# Patient Record
Sex: FEMALE | Race: OTHER
[De-identification: names, ages, dates, MRNs, and addresses within clinical notes are randomized per-mention and may not be internally consistent; named-entity substitution may affect disease eponyms.]

---

## 2019-12-20 ENCOUNTER — HOSPITAL ENCOUNTER (INPATIENT)
Dept: HOSPITAL 72 - SDSOVERFLO | Age: 29
LOS: 1 days | Discharge: HOME | DRG: 743 | End: 2019-12-21
Payer: COMMERCIAL

## 2019-12-20 VITALS — DIASTOLIC BLOOD PRESSURE: 62 MMHG | SYSTOLIC BLOOD PRESSURE: 96 MMHG

## 2019-12-20 VITALS — DIASTOLIC BLOOD PRESSURE: 68 MMHG | SYSTOLIC BLOOD PRESSURE: 102 MMHG

## 2019-12-20 VITALS — SYSTOLIC BLOOD PRESSURE: 105 MMHG | DIASTOLIC BLOOD PRESSURE: 62 MMHG

## 2019-12-20 VITALS — DIASTOLIC BLOOD PRESSURE: 70 MMHG | SYSTOLIC BLOOD PRESSURE: 102 MMHG

## 2019-12-20 VITALS — WEIGHT: 127 LBS | BODY MASS INDEX: 19.93 KG/M2 | HEIGHT: 67 IN

## 2019-12-20 VITALS — DIASTOLIC BLOOD PRESSURE: 60 MMHG | SYSTOLIC BLOOD PRESSURE: 93 MMHG

## 2019-12-20 VITALS — DIASTOLIC BLOOD PRESSURE: 69 MMHG | SYSTOLIC BLOOD PRESSURE: 102 MMHG

## 2019-12-20 VITALS — SYSTOLIC BLOOD PRESSURE: 104 MMHG | DIASTOLIC BLOOD PRESSURE: 65 MMHG

## 2019-12-20 VITALS — SYSTOLIC BLOOD PRESSURE: 102 MMHG | DIASTOLIC BLOOD PRESSURE: 69 MMHG

## 2019-12-20 VITALS — SYSTOLIC BLOOD PRESSURE: 100 MMHG | DIASTOLIC BLOOD PRESSURE: 64 MMHG

## 2019-12-20 VITALS — SYSTOLIC BLOOD PRESSURE: 92 MMHG | DIASTOLIC BLOOD PRESSURE: 61 MMHG

## 2019-12-20 VITALS — SYSTOLIC BLOOD PRESSURE: 99 MMHG | DIASTOLIC BLOOD PRESSURE: 66 MMHG

## 2019-12-20 VITALS — DIASTOLIC BLOOD PRESSURE: 67 MMHG | SYSTOLIC BLOOD PRESSURE: 113 MMHG

## 2019-12-20 DIAGNOSIS — D25.2: ICD-10-CM

## 2019-12-20 DIAGNOSIS — N83.291: ICD-10-CM

## 2019-12-20 DIAGNOSIS — D25.1: Primary | ICD-10-CM

## 2019-12-20 PROCEDURE — 94150 VITAL CAPACITY TEST: CPT

## 2019-12-20 PROCEDURE — 81025 URINE PREGNANCY TEST: CPT

## 2019-12-20 PROCEDURE — 94003 VENT MGMT INPAT SUBQ DAY: CPT

## 2019-12-20 PROCEDURE — 0UDB7ZZ EXTRACTION OF ENDOMETRIUM, VIA NATURAL OR ARTIFICIAL OPENING: ICD-10-PCS

## 2019-12-20 PROCEDURE — 80048 BASIC METABOLIC PNL TOTAL CA: CPT

## 2019-12-20 PROCEDURE — 36415 COLL VENOUS BLD VENIPUNCTURE: CPT

## 2019-12-20 PROCEDURE — 86850 RBC ANTIBODY SCREEN: CPT

## 2019-12-20 PROCEDURE — 86900 BLOOD TYPING SEROLOGIC ABO: CPT

## 2019-12-20 PROCEDURE — 85025 COMPLETE CBC W/AUTO DIFF WBC: CPT

## 2019-12-20 PROCEDURE — 86901 BLOOD TYPING SEROLOGIC RH(D): CPT

## 2019-12-20 PROCEDURE — 87081 CULTURE SCREEN ONLY: CPT

## 2019-12-20 PROCEDURE — 0UB90ZZ EXCISION OF UTERUS, OPEN APPROACH: ICD-10-PCS

## 2019-12-20 RX ADMIN — SODIUM CHLORIDE, SODIUM LACTATE, POTASSIUM CHLORIDE, AND CALCIUM CHLORIDE SCH MLS/HR: .6; .31; .03; .02 INJECTION, SOLUTION INTRAVENOUS at 18:36

## 2019-12-20 RX ADMIN — KETOROLAC TROMETHAMINE SCH MG: 30 INJECTION, SOLUTION INTRAMUSCULAR; INTRAVENOUS at 18:04

## 2019-12-20 RX ADMIN — DOCUSATE SODIUM SCH MG: 100 CAPSULE, LIQUID FILLED ORAL at 18:04

## 2019-12-20 RX ADMIN — DOCUSATE SODIUM SCH MG: 100 CAPSULE, LIQUID FILLED ORAL at 17:41

## 2019-12-20 RX ADMIN — KETOROLAC TROMETHAMINE SCH MG: 30 INJECTION, SOLUTION INTRAMUSCULAR; INTRAVENOUS at 13:12

## 2019-12-20 RX ADMIN — SODIUM CHLORIDE PRN MG: 9 INJECTION, SOLUTION INTRAVENOUS at 21:02

## 2019-12-20 NOTE — ANETHESIA PREOPERATIVE EVAL
Anesthesia Pre-op PMH/ROS


General


Date of Evaluation:  Dec 20, 2019


Time of Evaluation:  07:30


Anesthesiologist:  nuria


ASA Score:  ASA 1


Mallampati Score


Class I : Soft palate, uvula, fauces, pillars visible


Class II: Soft palate, uvula, fauces visible


Class III: Soft palate, base of uvula visible


Class IV: Only hard plate visible


Mallampati Classification:  Class II


Surgeon:  Eliana


Diagnosis:  Uterine fiborids


Surgical Procedure:  Abd open myomectomy


Anesthesia History:  none


Family History:  no anesthesia problems


Allergies:  


Coded Allergies:  


     No Known Allergies (Unverified , 12/18/19)


Medications:  see eMAR


Patient NPO?:  Yes


NPO Date:  Dec 19, 2019


NPO Time:  2000





Past Medical History


Cardiovascular:  Denies: HTN, CAD, MI, valve dz, arrhythmia, other


Pulmonary:  Denies: asthma, COPD, YNES, other


Gastrointestinal/Genitourinary:  Denies: GERD, CRI, ESRD, other


Neurologic/Psychiatric:  Denies: dementia, CVA, depression/anxiety, TIA, other


Endocrine:  Denies: DM, hypothyroidism, steroids, other


HEENT:  Denies: cataract (L), cataract (R), glaucoma, Qawalangin (L), Qawalangin (R), other


Hematology/Immune:  Denies: anemia, DVT, bleeding disorder, other


Musculoskeletal/Integumentary:  Denies: OA, RA, DJD, DDD, edema, other


PSxH Narrative:


0ral surgery





Anesthesia Pre-op Phys. Exam


Physician Exam





Last Vital Signs








  Date Time  Temp Pulse Resp B/P (MAP) Pulse Ox O2 Delivery O2 Flow Rate FiO2


 


12/20/19 10:19  100 14  98   


 


12/20/19 07:08 97.5   102/68 (79)    


 


12/20/19 06:43      Room Air  








Constitutional:  NAD


Neurologic:  CN 2-12 intact


Cardiovascular:  RRR


Respiratory:  CTA


Gastrointestinal:  S/NT/ND





Airway Exam


Mallampati Classification


2


Mallampati Score:  Class II


MO:  full


ROM:  full


Dentures:  no upper, no lower





Anesthesia Pre-op A/P


Labs


Urine Pregnancy Test











Test


  12/20/19


06:25


 


Urine HCG, Qualitative


  Negative


(NEGATIVE)











Studies


Pre-op Studies:  EKG - SR





Risk Assessment & Plan


Plan:


general Spinal





Pre-Antibiotics


Drug:  cefoxitin


Given Within 1 Hr of Incision:  Yes


Time Given:  07:40











Gretchen Yu CRNA Dec 20, 2019 10:33

## 2019-12-20 NOTE — NUR
NURSE NOTES:

Received pt from DEANDRA Jovel. AAO x 4, on room air, ambulatory. Surgical site dressing intact 
and dry with no s/s of infection. IV site intact and running IVF @ 75cc/hr. Morirson intact and 
draining yellow urine well by gravity. Family member at bedside. Bed locked, lowest 
position, alarm on, side rails up, call light within reach. Will continue to monitor.

## 2019-12-20 NOTE — IMMEDIATE POST-OP EVALUATION
Immediate Post-Op Evalulation


Immediate Post-Op Evalulation


Procedure:  D&C; open abd Myomectomy


Date of Evaluation:  Dec 20, 2019


Time of Evaluation:  10:18


IV Fluids:  2000


Blood Products:  0


Estimated Blood Loss:  250


Urinary Output:  175


Blood Pressure Systolic:  100


Blood Pressure Diastolic:  66


Pulse Rate:  100


Respiratory Rate:  14


O2 Sat by Pulse Oximetry:  98


Temperature (Fahrenheit):  97.4


Nausea:  No


Vomiting:  No


Complications


none


Patient Status:  awake, reacts, patent


Hydration Status:  adequate


Drug:  cefoxitin


Given Within 1 Hr of Incision:  Yes


Time Given:  07:35











Gretchen Yu CRNA Dec 20, 2019 10:19

## 2019-12-20 NOTE — PRE-PROCEDURE NOTE/ATTESTATION
Pre-Procedure Note/Attestation


Complete Prior to Procedure


Planned Procedure:  not applicable


Procedure Narrative:


Hysteroscopy, dilation and curettage, abdominal myomectomy





Indications for Procedure


Pre-Operative Diagnosis:


Uterine fibroids





Attestation


I attest that I discussed the nature of the procedure; its benefits; risks and 

complications; and alternatives (and the risks and benefits of such alternatives

), prior to the procedure, with the patient (or the patient's legal 

representative).





I attest that, if there was a reasonable possibility of needing a blood 

transfusion, the patient (or the patient's legal representative) was given the 

[facility] standardized written summary, pursuant 

to the Richard Samantha Blood Safety Act ([place] Health and Safety Code # 1645, as 

amended).





I attest that I re-evaluated the patient just prior to the surgery and that 

there has been no change in the patient's H&P, except as documented below:





NONE











Roxann Monroy M.D. Dec 20, 2019 07:19

## 2019-12-20 NOTE — NUR
NURSE NOTES:

Received report from Vanessa LIRIANO. pt a/a/o x4 laying in bed with no signs of distress or other 
issues at this time. pain 2/10.  O2 at 3L via n/c. surgical dressing dry and intact. IV 
right HA gauge #20 running LR @125ml/hr.  Morrison cath in place draining well with clear 
yellow urine. pt's  at bedside. call light within reach, bed in lowest position, side 
rales up x2. I will f/u as needed.

## 2019-12-20 NOTE — BRIEF OPERATIVE NOTE
Immediate Post Operative Note


Operative Note


Chief Complaint:  Uterine fibroids


Pre-op Diagnosis:


Uterine fibroids


Procedure:


Hysteroscopy, dilation and curettage, abdominal myomectomy


Post-op Diagnosis:


Uterine fibroids


Post-op Diagnosis:  same as pre-op


Findings:  consistent w/pre-op dx studies


Surgeon:  Roxann Monroy MD


Assistant:  Ermelinda Wilde MD


Anesthesiologist:  Gretchen Stearns


Anesthesia:  general


Specimen:  yes - Endometrial curettings, uterin fibroids


Complications:  none


Condition:  stable


Fluids:  2000ml crystalloid


Estimated Blood Loss:  volume - 250ml


Drains:  other - Morrison catheter


Implant(s) used?:  No











Roxann Monroy M.D. Dec 20, 2019 10:04

## 2019-12-20 NOTE — OPERATIVE NOTE - PDOC
Operative Note


Operative Note


Date of Operation/Procedure:  Dec 20, 2019


Chief Complaint:  Uterine fibroids


Pre-op Diagnosis:


Uterine fibroids


Procedure:


Hysteroscopy, dilation and curettage, abdominal myomectomy


Post-op Diagnosis:


Uterine fibroids


Post-op Diagnosis:  same as pre-op


Operative Findings:  consistent w/pre-op dx studies


Surgeon:  Roxann Monroy MD


Assistant:  Ermelinda Wilde MD


Anesthesiologist:  Gretchen Bautista


Anesthesia:  general


Specimen:  yes - Endometrial curettings, uterin fibroids


Complications:  none


Condition:  stable


Fluids:  2000ml crystalloid


Estimated Blood Loss:  volume - 250ml


Drains:  other - Morrison catheter


Implant(s) used?:  No


Indications for Procedure


Uterine fibroids with bulk symptoms


Description of Procedure





The R/B/A of the procedure were discussed with the patient and informed consent 

was obtained. The patient was taken to the operating room with IV in place. 

Spinal anesthesia was administered without difficulty. SCD stockings were in 

place. General anesthesia was administered without difficulty. The patient was 

placed in dorsal lithotomy and then prepped and draped in the usual sterile 

fashion. Morrison catheter was placed. A time out was performed to verify patient 

and procedure.





A bivalve speculum was inserted into the vagina. The anterior lip of the cervix 

was grasped with a single-toothed tenaculum. The cervix was sequentially 

dilated to accommodate the diagnostic hysteroscope. The hysteroscope was 

inserted and the uterine cavity was visualized and appeared normal. Bilateral 

ostia were visualized and appeared normal. The endometrial lining appeared thin 

and no other abnormalities were noted. The hysteroscope was withdrawn and 

fractional curettage was performed. The uterus was sounded to 7cm. A uterine 

manipulator was placed, stabilized, and attention was then turned to the 

abdomen.





A 7cm Pfannenstiel incision was made with the scalpel. This was carried down 

through the subcutaneous tissue to the fascia with the bovie. The fascia was 

incised at the midline and extended laterally with the bovie. The fascia was 

 from the underlying rectus tissue superiorly and inferiorly using 

blunt dissection and with the bovie. The peritoneum was identified, entered 

bluntly and stretched, with care being taken to identify and avoid the bladder. 

The Juvenal retractor was placed and stabilized without complication. The uterus 

was inspected and the uterine fibroids were palpated.





There was a large ~12-13cm right fundal pedunculated fibroid noted. The fibroid 

was grasped and elevated to the level of the incision and the base of the 

fibroid was injected with dilute vasopressin. The fibroid was sequentially 

resected and ultimately removed at its base. Once removed, the myometrium and 

serosa were reapproximated using Vicryl suture. There were 2 small pedunculated 

fibroids (1-2cm) on the mid-anterior surface of the uterus, one on the left and 

one on the right. These fibroids were amputated and the underlying serosa was 

reapproximated with Vicryl suture. The uterus was inspected and a posterior 

superficial intramural fibroid was noted, measuring approximately 4cm. Dilute 

vasopressin was injected and the overlying serosa and myometrium were incised. 

The fibroid was removed in its entirety at its base. The myometrium and serosa 

were reapproximated with Vicryl suture. The uterus was inspected for 

hemostasis. Figure of eight sutures were used to obtain excellent hemostasis. 





The abdomen was irrigated with saline. The uterus was again inspected and noted 

to be hemostatic. The Juvenal retractor was removed without incident. A manual 

and visual sweep was performed. Interceed was placed over the uterus as an 

adhesion barrier. The anterior peritoneum was closed with running suture of 2-0 

Vicryl and the rectus muscles were reapproximated with interrupted mattress 

sutures of 2-0 Vicryl. Excellent hemostasis was noted. The fascia was closed 

with running 0-Vicryl. The subcutaneous layer was irrigated, all bleeding 

vessels were cauterized, and then closed with running 3-0 Plain suture. The 

skin was closed with 4-0 Moncryl in a subcuticular fashion. Steri strips and a 

pressure dressing were applied. 





Attention was turned to the vagina and the uterine manipulator was removed. 





All instrument, sponge, and needle counts were correct x 3. The patient was 

brought to the recovery room in stable condition. 





I was present and participated in all parts of the procedure.











Roxann Monroy M.D. Dec 20, 2019 10:05

## 2019-12-21 VITALS — DIASTOLIC BLOOD PRESSURE: 67 MMHG | SYSTOLIC BLOOD PRESSURE: 112 MMHG

## 2019-12-21 VITALS — DIASTOLIC BLOOD PRESSURE: 68 MMHG | SYSTOLIC BLOOD PRESSURE: 104 MMHG

## 2019-12-21 VITALS — SYSTOLIC BLOOD PRESSURE: 97 MMHG | DIASTOLIC BLOOD PRESSURE: 66 MMHG

## 2019-12-21 VITALS — DIASTOLIC BLOOD PRESSURE: 67 MMHG | SYSTOLIC BLOOD PRESSURE: 106 MMHG

## 2019-12-21 VITALS — DIASTOLIC BLOOD PRESSURE: 75 MMHG | SYSTOLIC BLOOD PRESSURE: 108 MMHG

## 2019-12-21 LAB
ADD MANUAL DIFF: NO
ANION GAP SERPL CALC-SCNC: 8 MMOL/L (ref 5–15)
BASOPHILS NFR BLD AUTO: 0.6 % (ref 0–2)
BUN SERPL-MCNC: 14 MG/DL (ref 7–18)
CALCIUM SERPL-MCNC: 7.8 MG/DL (ref 8.5–10.1)
CHLORIDE SERPL-SCNC: 106 MMOL/L (ref 98–107)
CO2 SERPL-SCNC: 23 MMOL/L (ref 21–32)
CREAT SERPL-MCNC: 0.6 MG/DL (ref 0.55–1.3)
EOSINOPHIL NFR BLD AUTO: 3.3 % (ref 0–3)
ERYTHROCYTE [DISTWIDTH] IN BLOOD BY AUTOMATED COUNT: 11.3 % (ref 11.6–14.8)
HCT VFR BLD CALC: 32.5 % (ref 37–47)
HGB BLD-MCNC: 10.8 G/DL (ref 12–16)
LYMPHOCYTES NFR BLD AUTO: 11.6 % (ref 20–45)
MCV RBC AUTO: 90 FL (ref 80–99)
MONOCYTES NFR BLD AUTO: 6.1 % (ref 1–10)
NEUTROPHILS NFR BLD AUTO: 78.4 % (ref 45–75)
PLATELET # BLD: 164 K/UL (ref 150–450)
POTASSIUM SERPL-SCNC: 3.6 MMOL/L (ref 3.5–5.1)
RBC # BLD AUTO: 3.6 M/UL (ref 4.2–5.4)
SODIUM SERPL-SCNC: 137 MMOL/L (ref 136–145)
WBC # BLD AUTO: 9.8 K/UL (ref 4.8–10.8)

## 2019-12-21 RX ADMIN — SODIUM CHLORIDE PRN MG: 9 INJECTION, SOLUTION INTRAVENOUS at 09:53

## 2019-12-21 RX ADMIN — KETOROLAC TROMETHAMINE SCH MG: 30 INJECTION, SOLUTION INTRAMUSCULAR; INTRAVENOUS at 11:42

## 2019-12-21 RX ADMIN — SODIUM CHLORIDE PRN MG: 9 INJECTION, SOLUTION INTRAVENOUS at 03:53

## 2019-12-21 RX ADMIN — KETOROLAC TROMETHAMINE SCH MG: 30 INJECTION, SOLUTION INTRAMUSCULAR; INTRAVENOUS at 06:12

## 2019-12-21 RX ADMIN — SODIUM CHLORIDE, SODIUM LACTATE, POTASSIUM CHLORIDE, AND CALCIUM CHLORIDE SCH MLS/HR: .6; .31; .03; .02 INJECTION, SOLUTION INTRAVENOUS at 13:34

## 2019-12-21 RX ADMIN — DOCUSATE SODIUM SCH MG: 100 CAPSULE, LIQUID FILLED ORAL at 18:28

## 2019-12-21 RX ADMIN — KETOROLAC TROMETHAMINE SCH MG: 30 INJECTION, SOLUTION INTRAMUSCULAR; INTRAVENOUS at 18:28

## 2019-12-21 RX ADMIN — SODIUM CHLORIDE, SODIUM LACTATE, POTASSIUM CHLORIDE, AND CALCIUM CHLORIDE SCH MLS/HR: .6; .31; .03; .02 INJECTION, SOLUTION INTRAVENOUS at 00:43

## 2019-12-21 RX ADMIN — KETOROLAC TROMETHAMINE SCH MG: 30 INJECTION, SOLUTION INTRAMUSCULAR; INTRAVENOUS at 00:46

## 2019-12-21 RX ADMIN — SODIUM CHLORIDE PRN MG: 9 INJECTION, SOLUTION INTRAVENOUS at 13:27

## 2019-12-21 NOTE — NUR
NURSE NOTES:

Patient is in bed awake and able to verbalize needs. Stable. Denies pain or SOB. Patient 
states, "I just want to sleep." Patient appears sleepy. Plan of care discussed with patient, 
verbalized understanding. Patient left in bed in locked and lowest position with call light 
within reach. WIll continue to monitor.

## 2019-12-21 NOTE — 48 HOUR POST ANESTHESIA EVAL
Post Anesthesia Evaluation


Procedure:  D&C; open abd Myomectomy


Date of Evaluation:  Dec 21, 2019


Time of Evaluation:  10:33


Blood Pressure Systolic:  114


0:  72


Pulse Rate:  76


Respiratory Rate:  20


Temperature (Fahrenheit):  97.6


O2 Sat by Pulse Oximetry:  98


Airway:  patent


Nausea:  No


Vomiting:  No


Pain Intensity:  3


Hydration Status:  adequate


Cardiopulmonary Status:


stable


Mental Status/LOC:  patient returned to baseline


Follow-up Care/Observations:


n/a


Post-Anesthesia Complications:


none


Follow-up care needed:  N/A











Ady Ulloa MD Dec 21, 2019 10:34

## 2019-12-21 NOTE — NUR
DISCHARGE NOTE:

D/C'd pt to home as ordered by MD. Pt a&ox4, denies pain, nausea & vomiting. D/C 
instructions carefully explained to pt & was signed. All questions answered. Pt belongings 
rechecked & signed. Pt refused to be wheeled on wheelchair. Accompanied pt downstairs by 
 & staff.

## 2019-12-21 NOTE — NUR
NURSE NOTES:

Received report from DEANDRA Blanc. Pt lying in bed, a&ox4, in room air, family member at 
bedside. No s/s of acute distress & no c/o pain at this time. Surgical dressing C/D/I. IV 
site intact & H/L'd. PT to be d/c'd to home tonight as ordered. D/C paperwork ready. Will 
have it signed by pt in a bit. Bed in lowest position, call light within reach. Will 
continue to monitor.

## 2019-12-22 NOTE — NUR
CASE MANAGEMENT:



CM review and clinical information (face sheet/   H&P/operative note/DC summary) faxed to 
[employer] [facility] @ [phone].  CERT # [id number]

## 2019-12-24 NOTE — DISCHARGE SUMMARY
Discharge Summary


Hospital Course


Date of Admission


Dec 20, 2019 at 06:07


Date of Discharge


Dec 21, 2019 at 20:26


Admitting Diagnosis


uterine fibroids


Reason for Hospitalization:  elective surgery


HPI


Fadumo Dubose is a 29 year old female who was admitted on Dec 20, 

2019 at 06:07 for Uterine Fibroids.


Patient was admitted for elective surgery.


Procedures


s/p 12/20/19 by Dr Monroy 


Hysteroscopy, dilation and curettage, abdominal myomectomy


Hospital Course


status post surgery 


course of recovery uneventful 


initially IV fluids


s/p perioperative antibiotic 


hemodynamic status  closely monitored,  remained stable 


incision clean dry and intact 


pain management was addressed ; pain controlled 


transitioned to oral analgesic


ambulated 


use of incentive spirometry was  encouraged while in the bed 


tolerated diet , IV fluids discontinued 


GI prophylaxis provided 


antiemetics were on board as needed 


voided freely


passed gas, had small BM 


bowel regimen instituted 


patient was stable for discharge 


discharge instructions provided 


follow up with surgeon in the office as advised 








FINAL DIAGNOSES


1. Uterine fibroids 


2. Hysteroscopy, dilation and curettage, abdominal myomectomy





Discharge Medications


Continued Medications:  


[Microgestin/Fe1/20] ()  


1 TAB PO DA (This prescription has been renewed)











Discharge


Condition Upon Discharge:  stable


Discharge Disposition


Patient was discharged home





Discharge Instructions


Discharge Instructions


Special Instructions


I have been assigned to complete a D/C Summary on this account. I was not 

involved in the patient management











Mariela Roberts NP Dec 24, 2019 09:37
